# Patient Record
Sex: FEMALE | NOT HISPANIC OR LATINO | Employment: FULL TIME | URBAN - METROPOLITAN AREA
[De-identification: names, ages, dates, MRNs, and addresses within clinical notes are randomized per-mention and may not be internally consistent; named-entity substitution may affect disease eponyms.]

---

## 2024-11-06 ENCOUNTER — OFFICE VISIT (OUTPATIENT)
Dept: ORTHOPEDIC SURGERY | Facility: HOSPITAL | Age: 49
End: 2024-11-06
Payer: COMMERCIAL

## 2024-11-06 ENCOUNTER — HOSPITAL ENCOUNTER (OUTPATIENT)
Dept: RADIOLOGY | Facility: HOSPITAL | Age: 49
Discharge: HOME | End: 2024-11-06
Payer: COMMERCIAL

## 2024-11-06 VITALS — BODY MASS INDEX: 41.65 KG/M2 | HEIGHT: 65 IN | WEIGHT: 250 LBS

## 2024-11-06 DIAGNOSIS — M25.561 RIGHT KNEE PAIN, UNSPECIFIED CHRONICITY: ICD-10-CM

## 2024-11-06 DIAGNOSIS — M17.11 OSTEOARTHRITIS OF RIGHT KNEE, UNSPECIFIED OSTEOARTHRITIS TYPE: ICD-10-CM

## 2024-11-06 PROCEDURE — 99213 OFFICE O/P EST LOW 20 MIN: CPT | Performed by: ORTHOPAEDIC SURGERY

## 2024-11-06 PROCEDURE — 73564 X-RAY EXAM KNEE 4 OR MORE: CPT | Mod: RIGHT SIDE | Performed by: RADIOLOGY

## 2024-11-06 PROCEDURE — 99203 OFFICE O/P NEW LOW 30 MIN: CPT | Performed by: ORTHOPAEDIC SURGERY

## 2024-11-06 PROCEDURE — 73564 X-RAY EXAM KNEE 4 OR MORE: CPT | Mod: RT

## 2024-11-06 PROCEDURE — 3008F BODY MASS INDEX DOCD: CPT | Performed by: ORTHOPAEDIC SURGERY

## 2024-11-06 RX ORDER — LEVOTHYROXINE SODIUM 125 UG/1
1 TABLET ORAL
COMMUNITY
Start: 2024-09-21

## 2024-11-06 RX ORDER — MELOXICAM 7.5 MG/1
TABLET ORAL
COMMUNITY
Start: 2024-10-13

## 2024-11-06 RX ORDER — DEXTROAMPHETAMINE SACCHARATE, AMPHETAMINE ASPARTATE, DEXTROAMPHETAMINE SULFATE AND AMPHETAMINE SULFATE 3.75; 3.75; 3.75; 3.75 MG/1; MG/1; MG/1; MG/1
1 TABLET ORAL
COMMUNITY
Start: 2024-10-29

## 2024-11-06 NOTE — PROGRESS NOTES
This is a 48-year-old female that comes to see me today for chief complaint of right knee pain.  Pain is globally located and diffuse.  It is achy in nature.  She also has some pain in the back of the knee.  She has a Baker's cyst as well as osteoarthritis.    This is a pleasant patient in no acute distress.  They are alert and oriented x3.  They are of normal mood and affect.  They are in no acute distress.  The patient's limb is warm and well-perfused.  They have intact sensation to light touch in all lower extremity dermatomes.  The patient's quadriceps and hamstring strength is 5 of 5.  The patient can do a straight leg raise    ROM is from 0-125 deg    The patient has a weak core.  The patient has tight hamstrings.  The patient has one out of three patellofemoral crepitus.  They have some mild increased patellar tilt    They have  a stable Lachman, negative posterior drawer.  No posterior sag.  The patient is stable to varus and valgus stress at both 0 and 30°    There is mild medial joint line tenderness.  No lateral jointline tenderness    The patient has no effusion    X-rays reveal no fractures or dislocations well-preserved joint space but some early arthritis noted in all 3 compartments    Patient has osteoarthritis we recommended physical therapy for this as well as consideration of an aspiration and injection with one of our partners via ultrasound of both the Baker's cyst and the joint plan to see her back as needed

## 2024-11-22 ENCOUNTER — OFFICE VISIT (OUTPATIENT)
Dept: ORTHOPEDIC SURGERY | Facility: HOSPITAL | Age: 49
End: 2024-11-22
Payer: COMMERCIAL

## 2024-11-22 ENCOUNTER — HOSPITAL ENCOUNTER (OUTPATIENT)
Dept: RADIOLOGY | Facility: EXTERNAL LOCATION | Age: 49
Discharge: HOME | End: 2024-11-22

## 2024-11-22 DIAGNOSIS — M17.11 OSTEOARTHRITIS OF RIGHT KNEE, UNSPECIFIED OSTEOARTHRITIS TYPE: ICD-10-CM

## 2024-11-22 DIAGNOSIS — M22.2X1 PATELLOFEMORAL PAIN SYNDROME OF RIGHT KNEE: ICD-10-CM

## 2024-11-22 PROCEDURE — 99214 OFFICE O/P EST MOD 30 MIN: CPT | Performed by: STUDENT IN AN ORGANIZED HEALTH CARE EDUCATION/TRAINING PROGRAM

## 2024-11-22 PROCEDURE — 99214 OFFICE O/P EST MOD 30 MIN: CPT | Mod: 25 | Performed by: STUDENT IN AN ORGANIZED HEALTH CARE EDUCATION/TRAINING PROGRAM

## 2024-11-22 PROCEDURE — 20611 DRAIN/INJ JOINT/BURSA W/US: CPT | Mod: RT | Performed by: STUDENT IN AN ORGANIZED HEALTH CARE EDUCATION/TRAINING PROGRAM

## 2024-11-22 PROCEDURE — 2500000004 HC RX 250 GENERAL PHARMACY W/ HCPCS (ALT 636 FOR OP/ED): Performed by: STUDENT IN AN ORGANIZED HEALTH CARE EDUCATION/TRAINING PROGRAM

## 2024-11-22 PROCEDURE — L1812 KO ELASTIC W/JOINTS PRE OTS: HCPCS | Performed by: STUDENT IN AN ORGANIZED HEALTH CARE EDUCATION/TRAINING PROGRAM

## 2024-11-22 RX ORDER — MELOXICAM 15 MG/1
15 TABLET ORAL DAILY
Qty: 30 TABLET | Refills: 0 | Status: SHIPPED | OUTPATIENT
Start: 2024-11-22 | End: 2024-12-22

## 2024-11-22 NOTE — PATIENT INSTRUCTIONS
You received an injection in the doctor's office today.    This procedure used a very sharp metal object that was inserted into your body to dispense medicine in a specific area to help with your orthopedic/musculoskeletal pain.    Although we try our best to make it as painless as possible, sometimes you can have an acute episode/flare of pain shortly after the injection.  This should not last a very long time.  Please continue to use any analgesics, anti-inflammatories or pain medicines (Tylenol, Advil, ibuprofen, or any prescription medicines) that you have been using for your pain control, until the injection medicine kicks in:   Cortisone/steroid--average 2 to 4 days  Hyaluronic acid/gel injections--average 4 to 6 weeks  If this post-procedure pain lasts for several hours and you cannot obtain control with the current medication you have, you may need to proceed to the emergency room to help with your pain control.  You can apply ice over the bandage for 15-20 minutes at a time, every 1-2 hours to help with bruising or any local pain control from the actual injection.    It is recommended that you keep your Band-Aid on for at least 4-6 hours after leaving the office.    No soaking in water today: No bathtubs, hot tubs or swimming pools.  You can shower later tonight.  You can do all the water you desire tomorrow or later.  Please limit your activity to normal walking and driving for the rest of today and tomorrow.  Anything you consider exercise of that extremity or cardio exercise of the lower extremity, please wait a minimum 48 hours.

## 2024-11-25 NOTE — PROGRESS NOTES
Sports Medicine Office Note    Today's Date:  11/22/2024     HPI: Jamilah Rodriguez is a 49 y.o. female with history of right knee DJD who presents today for evaluation of right knee pain.  She states she recently developed pain in her right knee without any associated injury/trauma with progressive worsening of pain.  States pain is throughout the entire right knee with mild associated swelling.  Denies any redness, warmth, bruising, radiation of pain, numbness, tingling, weakness.  She states pain is worse with standing/walking for longer durations and with squatting/lunging or kneeling.  She has tried OTC anti-inflammatory/analgesics with minimal to no improvement.  She was seen by Dr. Betancourt on 11/6/2024 and was referred to physical therapy for right knee OA and referred to me for right knee joint ultrasound-guided cortisone injection and possible right knee Baker's cyst aspiration.  She would like to proceed with injection and possible aspiration today if appropriate.    She has no other complaints.    Physical Examination:     The RIGHT knee is without obvious signs of acute bony deformity, erythema, ecchymosis.  There is trace to grade 1 joint effusion.  The patella is without tenderness. Apprehension is positive with medial and lateral glide. Patella crepitus is positive. Patella grind is positive. The medial joint line is slightly tender and without bony crepitus or step-off. The lateral joint line is slightly tender and without bony crepitus or step-off. Flexion & extension are full and symmetrical. Varus & valgus stress test at 0° and 30° of flexion elicited mild medial and lateral joint line laxity with associated pain that improved with return to neutral position. Lachman's, Panchito's, and posterior drawer are all negative. The opposite knee is nontender and stable. Gait is slightly painful, antalgic, and tandem.     Imaging:  Radiographs of the right knee obtained 11/6/2024 were reviewed and revealed  tricompartmental DJD, moderate in patellofemoral compartment and mild in medial/lateral compartment, otherwise no acute osseous abnormalities.  The studies were reviewed by me personally in the office today.    === 11/06/24 ===    XR KNEE RIGHT 4+ VIEWS    - Impression -  1. Moderate patellofemoral and mild medial/lateral compartment  osteoarthrosis of the right knee.    MACRO:  None    Signed by: Taylor Zhu 11/9/2024 6:15 AM  Dictation workstation:   [id number]    Problem List Items Addressed This Visit    None  Visit Diagnoses         Codes    Osteoarthritis of right knee, unspecified osteoarthritis type     M17.11    Relevant Medications    meloxicam (Mobic) 15 mg tablet    Other Relevant Orders    Point of Care Ultrasound (Completed)    Referral to Physical Therapy    Knee Brace, Hinged    L Inj/Asp: R knee    Patellofemoral pain syndrome of right knee     M22.2X1    Relevant Orders    Referral to Physical Therapy    Knee Brace, Hinged    L Inj/Asp: R knee          Procedure Note:  After consent was obtained, the right knee was prepped in a sterile fashion. Ultrasound guidance was used to help insure proper needle placement into the  joint , decrease patient discomfort, and decrease collateral damage. The joint was visualized and Depo-Medrol 80 mg with lidocaine 1% 2 mL & ropivacaine 0.5% 2 mL were injected without any complications. Ultrasound images were saved on an internal file for later reference. The patient tolerated the procedure well and the area was cleaned and bandaged.  Patient ID: Jamilah oRdriguez is a 49 y.o. female.    L Inj/Asp: R knee on 11/22/2024 10:53 AM  Indications: pain  Details: 22 G needle, ultrasound-guided superolateral approach  Medications: 2 mL lidocaine 10 mg/mL (1 %); 80 mg methylPREDNISolone acetate 40 mg/mL; 2 mL ropivacaine 5 mg/mL (0.5 %)  Outcome: tolerated well, no immediate complications  Procedure, treatment alternatives, risks and benefits explained, specific risks  discussed. Consent was given by the patient. Immediately prior to procedure a time out was called to verify the correct patient, procedure, equipment, support staff and site/side marked as required. Patient was prepped and draped in the usual sterile fashion.         Assessment and Plan:    We reviewed the exam and x-ray findings and discussed the conservative and surgical treatment options. We agreed to a diagnostic and hopefully therapeutic cortisone injection into the right knee joint.  Minimal Baker's cyst identified on point-of-care ultrasound, therefore deferred aspiration.  She tolerated this procedure well. Activity modifications were reviewed. Physical therapy referral provided and discussed the importance of regular home exercises.  Recommended prescription doses of Tylenol, Voltaren gel, and encouraged use of ice or heat as needed for acute flares of pain.  Prescribed short course of meloxicam 15 mg to be taken once daily with food as needed for acute flares of pain.  Provided reaction OA knee brace to be worn with daily activity and exercise.  Discussed with patient that we may consider repeating cortisone injection in 3 months or more if providing adequate relief.  Plan for follow-up in 3 months for re-evaluation, otherwise may follow-up sooner if any new concerns arise.  Discussed this plan with the patient who is understanding and agreeable.    Patient was prescribed a reaction OA knee brace for right knee DJD with patellofemoral and medial/lateral joint line instability with associated pain. The patient is ambulatory with or without aid; but, has weakness, instability and/or deformity of their right knee which requires stabilization from this orthosis to improve their function.      Verbal and written instructions for the use, wear schedule, cleaning and application of this item were given.  Patient was instructed that should the brace result in increased pain, decreased sensation, increased  swelling, or an overall worsening of their medical condition, to please contact our office immediately.     Orthotic management and training was provided for skin care, modifications due to healing tissues, edema changes, interruption in skin integrity, and safety precautions with the orthosis.     **This note was dictated using Dragon speech recognition software and was not corrected for spelling or grammatical errors**.    Nguyễn Sue,   Primary Care Sports Medicine  [facility] [place]

## 2024-12-16 RX ORDER — LIDOCAINE HYDROCHLORIDE 10 MG/ML
2 INJECTION, SOLUTION INFILTRATION; PERINEURAL
Status: COMPLETED | OUTPATIENT
Start: 2024-11-22 | End: 2024-11-22

## 2024-12-16 RX ORDER — ROPIVACAINE HYDROCHLORIDE 5 MG/ML
2 INJECTION, SOLUTION EPIDURAL; INFILTRATION; PERINEURAL
Status: COMPLETED | OUTPATIENT
Start: 2024-11-22 | End: 2024-11-22

## 2024-12-16 RX ORDER — METHYLPREDNISOLONE ACETATE 40 MG/ML
80 INJECTION, SUSPENSION INTRA-ARTICULAR; INTRALESIONAL; INTRAMUSCULAR; SOFT TISSUE
Status: COMPLETED | OUTPATIENT
Start: 2024-11-22 | End: 2024-11-22

## 2024-12-21 DIAGNOSIS — M17.11 OSTEOARTHRITIS OF RIGHT KNEE, UNSPECIFIED OSTEOARTHRITIS TYPE: ICD-10-CM

## 2024-12-21 RX ORDER — MELOXICAM 15 MG/1
15 TABLET ORAL DAILY
Qty: 30 TABLET | Refills: 0 | Status: SHIPPED | OUTPATIENT
Start: 2024-12-21

## 2025-01-15 NOTE — PROGRESS NOTES
Physical Therapy  Physical Therapy Orthopedic Evaluation    Patient Name: Jamilah Rodriguez  MRN: [id number]  Today's Date: 1/16/2025  Time Calculation  Start Time: 1240  Stop Time: 1320  Time Calculation (min): 40 min  Reason for visit: right knee OA/PFS   Referring MD: Nguyễn Sue DO  Insurance: 2025 BENEFIT / NO AUTH / 20% COINS / 60 VISITS PT,OT,SP /  $750 DED / $3500 OOP /  [employer]   DX: right knee pain, PFS, knee OA right       Current Problem  1. Right knee pain, unspecified chronicity  Follow Up In Physical Therapy      2. Osteoarthritis of right knee, unspecified osteoarthritis type  Referral to Physical Therapy    Follow Up In Physical Therapy      3. Patellofemoral pain syndrome of right knee  Referral to Physical Therapy    Follow Up In Physical Therapy          General:  General  Reason for Referral: Right knee OA/PFS  Referred By: Nguyễn Sue/ Serafin  Precautions:  Precautions  STEADI Fall Risk Score (The score of 4 or more indicates an increased risk of falling): 0  Pain:       Subjective:     Subjective   Chief Complaint: 5 year history of right knee pain.  Developed a bakers cyst in 2023 that I tried to ignore /deal with but was not getting better.  Had cortisone injection that did seem to help, I also had bakers cyst drained     NATY: none     Current Condition: same/unchanged     PAIN  Intensity (0-10): 2-3/10 up to 6-7/10  Location: front mainly and inside, and back of knee   Description: achy , stiffness , tightness     Aggravating Factors:  movement, stairs, walking, getting up and down   Relieving Factors:  injection, ice, NSAID    Relevant Information (PMH & Previous Tests/Imaging):   Xray right knee   1. Moderate patellofemoral and mild medial/lateral compartment   osteoarthrosis of the right knee.     Previous Interventions/Treatments: injection / aspiration  (bakers cyst)     Prior Level of Function (PLOF)  Exercise/Physical Activity: yes, mainly walking   Work/School:Full Time   HR ,  progressive   Living situation/Environment: live alone , multistory/split level     Treatment Goals: get stronger, get back to be able to walk mile w/o pain , be able play pickle ball     Red Flags: Do you have any of the following? No   Fever/chills, unexplained weight changes, dizziness/fainting, unexplained change in bowel or bladder functions, unexplained malaise or muscle weakness, night pain/sweats, numbness or tingling  Medical history reviewed:  yes (scanned in chart)    Objective:  Objective       Observation/Posture: mild valgus R>L   Gait: non antalgic       Balance  SL Balance: good left , inc wobble right   Tandem: good   DL Squat:  partial range,  on toes,  favors left,  unable due to pain,   SL Squat: partial range,  valgus collapse,     Knee ROM        Left knee: Ext= 0 Flex= 135  Right knee: Ext= 0 Flex= 132    Knee Strength        Extension: L= 5/5 [] R= 5/5  Flexion: L= 5/5 [] R= 5/5       Hip Strength MMT  Flex: L= 4/5 [] R= 4-/5   Abd L= 4/5 [] R= 4-/5  Add L= 5/5 [] R= 5/5  Ext L= 4+/5 [] R= 4+/5      Flexibility:  Hamstrings: MIN  Quads:MIN  It Band:MOD          Outcome Measures:  Other Measures  Lower Extremity Funtional Score (LEFS): 40/80     EDUCATION: home exercise program, plan of care, activity modifications, pain management, and injury pathology       Goals:  Active       PT Problem       PT Goal 1       Start:  01/16/25    Expected End:  02/13/25       Patient will demonstrate good understanding and compliance with HEP   Right knee AROM 0-135          PT Goal 2       Start:  01/16/25    Expected End:  03/13/25       Lat dip w/o valgus collapse  Squat full range with even weight distribution  Hip strength 4+/5 or better to improve stability and function   Will ascend/descend stairs with normalized pattern   Increase LEFS score by 9 points              Treatments:   Access Code: [email or web address]  URL: [email or web address]  Date: 01/16/2025  Prepared by: Ken  Jackie    Exercises  - Sidelying Quadriceps Stretch  - 2-3 x daily - 1 sets - 3-4 reps - 30 hold  - Supine ITB Stretch with Strap  - 2-3 x daily - 3-4 reps - 30 hold  - Seated Table Hamstring Stretch  - 2-3 x daily - 1 sets - 3-4 reps - 30 hold  - Small Range Straight Leg Raise  - 1-2 x daily - 2-3 sets - 10 reps  - Sidelying Hip Abduction  - 1-2 x daily - 2-3 sets - 10 reps  - Prone Hip Extension  - 1-2 x daily - 2-3 sets - 10 reps    Assessment: Patient presents with signs and symptoms consistent with right knee OA/PFS , resulting in limited participation in pain-free ADLs and inability to perform at their prior level of function. Pt would benefit from physical therapy to address the impairments found & listed previously in the objective section in order to return to safe and pain-free ADLs and prior level of function.     Pain, Impaired balance, Impaired gait, Impaired stair negotiation , Impaired transfers, Decreased flexibility, Decreased strength, Limitations to normal ADL's, and Decreased knowledge of HEP     Clinical presentation:  Stable   Level of Complexity low     Plan:      Planned Interventions include: therapeutic exercise, self-care home management, manual therapy, therapeutic activities, gait training, neuromuscular coordination, aquatic therapy, modalities PRN  Frequency: 1 /week   Duration: 4-6 weeks    Ken Mejia, PT

## 2025-01-16 ENCOUNTER — EVALUATION (OUTPATIENT)
Dept: PHYSICAL THERAPY | Facility: CLINIC | Age: 50
End: 2025-01-16
Payer: COMMERCIAL

## 2025-01-16 DIAGNOSIS — M17.11 OSTEOARTHRITIS OF RIGHT KNEE, UNSPECIFIED OSTEOARTHRITIS TYPE: ICD-10-CM

## 2025-01-16 DIAGNOSIS — M25.561 RIGHT KNEE PAIN, UNSPECIFIED CHRONICITY: Primary | ICD-10-CM

## 2025-01-16 DIAGNOSIS — M22.2X1 PATELLOFEMORAL PAIN SYNDROME OF RIGHT KNEE: ICD-10-CM

## 2025-01-16 PROCEDURE — 97161 PT EVAL LOW COMPLEX 20 MIN: CPT | Mod: GP | Performed by: PHYSICAL THERAPIST

## 2025-01-16 PROCEDURE — 97110 THERAPEUTIC EXERCISES: CPT | Mod: GP | Performed by: PHYSICAL THERAPIST

## 2025-01-21 ENCOUNTER — TREATMENT (OUTPATIENT)
Dept: PHYSICAL THERAPY | Facility: CLINIC | Age: 50
End: 2025-01-21
Payer: COMMERCIAL

## 2025-01-21 DIAGNOSIS — M22.2X1 PATELLOFEMORAL PAIN SYNDROME OF RIGHT KNEE: ICD-10-CM

## 2025-01-21 DIAGNOSIS — M25.561 RIGHT KNEE PAIN, UNSPECIFIED CHRONICITY: ICD-10-CM

## 2025-01-21 DIAGNOSIS — M17.11 OSTEOARTHRITIS OF RIGHT KNEE, UNSPECIFIED OSTEOARTHRITIS TYPE: ICD-10-CM

## 2025-01-21 PROCEDURE — 97110 THERAPEUTIC EXERCISES: CPT | Mod: GP | Performed by: PHYSICAL THERAPIST

## 2025-01-21 NOTE — PROGRESS NOTES
Physical Therapy  Physical Therapy Treatment    Patient Name: Jamilah Rodriguez  MRN: [id number]  Today's Date: 1/21/2025  Time Calculation  Start Time: 0915  Stop Time: 0955  Time Calculation (min): 40 min    Visit # 2   Reason for visit: right knee OA/PFS   Referring MD: Nguyễn Sue DO  Insurance: 2025 BENEFIT / NO AUTH / 20% COINS / 60 VISITS PT,OT,SP /  $750 DED / $3500 OOP /  [employer]   DX: right knee pain, PFS, knee OA right     Current Problem  1. Osteoarthritis of right knee, unspecified osteoarthritis type  Follow Up In Physical Therapy      2. Patellofemoral pain syndrome of right knee  Follow Up In Physical Therapy      3. Right knee pain, unspecified chronicity  Follow Up In Physical Therapy          General  Reason for Referral: Right knee OA/PFS  Referred By: Nguyễn Sue/ Serafin Villalobos  Precautions  STEADI Fall Risk Score (The score of 4 or more indicates an increased risk of falling): 0.  Pain       Subjective:   Subjective   Patient reports 2-3/10, bakers cyst more than the knee pain.  Reports compliance with HEP and feels good to do them.      Objective:   Objective   Hip Strength MMT  Flex:L= 4/5 [] R= 4-/5   AbdL= 4/5 [] R= 4-/5  AddL= 5/5 [] R= 5/5  ExtL= 4+/5 [] R= 4+/5    Flexibility:  Hamstrings: MIN  Quads:MIN  It Band:MOD       Treatments:    Stepper L3 x5'   Stand quad stretch 1' chelsy   IT band wall 1'   SLR 2x10 chelsy   Side hip abd/add chelsy  2x10   Prone hip ext 2x10   Squat GTB abd 2x10 (Added to HEP)    Inch worm GTB 20' x2 laps (Added to HEP)       Access Code: [email or web address]     Charges 3 TE     - PT Therapeutic Procedures Time Entry  Therapeutic Exercise Time Entry: 40 -       Assessment:    Instructed on standing versions of stretches to add to HEP.  Tolerated session and additions w/o incident.      Plan:    Continue per POC to increase ROM, flexibility and strength to decrease pain and improve function in normal ADL's.        Ken Mejia, PT

## 2025-01-23 ENCOUNTER — APPOINTMENT (OUTPATIENT)
Dept: PHYSICAL THERAPY | Facility: CLINIC | Age: 50
End: 2025-01-23
Payer: COMMERCIAL

## 2025-01-29 ENCOUNTER — TREATMENT (OUTPATIENT)
Dept: PHYSICAL THERAPY | Facility: CLINIC | Age: 50
End: 2025-01-29
Payer: COMMERCIAL

## 2025-01-29 DIAGNOSIS — M22.2X1 PATELLOFEMORAL PAIN SYNDROME OF RIGHT KNEE: ICD-10-CM

## 2025-01-29 DIAGNOSIS — M25.561 RIGHT KNEE PAIN, UNSPECIFIED CHRONICITY: ICD-10-CM

## 2025-01-29 DIAGNOSIS — M17.11 OSTEOARTHRITIS OF RIGHT KNEE, UNSPECIFIED OSTEOARTHRITIS TYPE: ICD-10-CM

## 2025-01-29 PROCEDURE — 97110 THERAPEUTIC EXERCISES: CPT | Mod: GP | Performed by: PHYSICAL THERAPIST

## 2025-01-29 NOTE — PROGRESS NOTES
"Physical Therapy  Physical Therapy Treatment    Patient Name: Jamilah Rodriguez  MRN: [id number]  Today's Date: 1/29/2025  Time Calculation  Start Time: 0915  Stop Time: 1000  Time Calculation (min): 45 min    Visit # 3   Reason for visit: right knee OA/PFS   Referring MD: Nguyễn Sue DO  Insurance: 2025 BENEFIT / NO AUTH / 20% COINS / 60 VISITS PT,OT,SP /  $750 DED / $3500 OOP /  [employer]   DX: right knee pain, PFS, knee OA right     Current Problem  1. Osteoarthritis of right knee, unspecified osteoarthritis type  Follow Up In Physical Therapy      2. Patellofemoral pain syndrome of right knee  Follow Up In Physical Therapy      3. Right knee pain, unspecified chronicity  Follow Up In Physical Therapy          General  Reason for Referral: Right knee OA/PFS  Referred By: Nguyễn Sue/ Serafin Villalobos  Precautions  STEADI Fall Risk Score (The score of 4 or more indicates an increased risk of falling): 0  Pain       Subjective:   Subjective   Patient reports 1-2/10, bakers cyst more than the knee pain.  I played [employer] ball on Saturday for a couple of hours and it was good.  I did come home and ice when I got home.    HEP compliance - yes     Objective:   Objective   Hip Strength MMT  Flex:L= 5-/5 [] R= 5-/5   AbdL= 4/5 [] R= 4/5  AddL= 5/5 [] R= 5/5  ExtL= 5-/5 [] R= 5-/5    Flexibility:  Hamstrings: MIN  Quads:MIN  It Band:MOD       Treatments:    Stepper L3 x5'   Stand quad stretch 1' jessica   IT band wall 1'   Cybex hip abd/add  40# 2x10   Cybex hip ext 55# 2x10   SLR 2x10 jessica - in HEP    Side hip abd/add jessica  2x10 - in HEP   Prone hip ext 2x10 - in HEP    Squat GTB abd 2x10   Inch worm GTB 20' x2 laps   Lat dip 4\" 2x10   DBE 2' x2 way      Access Code: [email or web address]     Charges 3 TE     - PT Therapeutic Procedures Time Entry  Therapeutic Exercise Time Entry: 45 -       Assessment:    Improved strength noted in JESSICA hips and per patient subjective reports.        Plan:    Continue per POC to increase ROM, flexibility and " strength to decrease pain and improve function in normal ADL's.        Ken Mejia, PT

## 2025-01-30 ENCOUNTER — APPOINTMENT (OUTPATIENT)
Dept: PHYSICAL THERAPY | Facility: CLINIC | Age: 50
End: 2025-01-30
Payer: COMMERCIAL

## 2025-02-04 ENCOUNTER — TREATMENT (OUTPATIENT)
Dept: PHYSICAL THERAPY | Facility: CLINIC | Age: 50
End: 2025-02-04
Payer: COMMERCIAL

## 2025-02-04 DIAGNOSIS — M17.11 OSTEOARTHRITIS OF RIGHT KNEE, UNSPECIFIED OSTEOARTHRITIS TYPE: Primary | ICD-10-CM

## 2025-02-04 DIAGNOSIS — M25.561 RIGHT KNEE PAIN, UNSPECIFIED CHRONICITY: ICD-10-CM

## 2025-02-04 DIAGNOSIS — M22.2X1 PATELLOFEMORAL PAIN SYNDROME OF RIGHT KNEE: ICD-10-CM

## 2025-02-04 PROCEDURE — 97110 THERAPEUTIC EXERCISES: CPT | Mod: GP,CQ

## 2025-02-04 ASSESSMENT — PAIN - FUNCTIONAL ASSESSMENT: PAIN_FUNCTIONAL_ASSESSMENT: 0-10

## 2025-02-04 ASSESSMENT — PAIN SCALES - GENERAL: PAINLEVEL_OUTOF10: 4

## 2025-02-04 NOTE — PROGRESS NOTES
Physical Therapy  Physical Therapy Treatment    Patient Name: Jamilah Rodriguez  MRN: [id number]  Today's Date: 2/4/2025  Time Calculation  Start Time: 1515  Stop Time: 1555  Time Calculation (min): 40 min    Visit # 4   Reason for visit: right knee OA/PFS   Referring MD: Nguyễn Sue DO  Insurance: 2025 BENEFIT / NO AUTH / 20% COINS / 60 VISITS PT,OT,SP /  $750 DED / $3500 OOP /  [employer]   DX: right knee pain, PFS, knee OA right     Current Problem  1. Osteoarthritis of right knee, unspecified osteoarthritis type  Follow Up In Physical Therapy      2. Patellofemoral pain syndrome of right knee  Follow Up In Physical Therapy      3. Right knee pain, unspecified chronicity  Follow Up In Physical Therapy        General  Reason for Referral: Right knee OA/PFS  Referred By: Nguyễn Sue/ Serafin Villalobos  Precautions  STEADI Fall Risk Score (The score of 4 or more indicates an increased risk of falling): 0  Pain  Pain Assessment: 0-10  0-10 (Numeric) Pain Score: 4    Subjective:   Subjective   Patient reports having more anterior pain than pain in posterior portion.   HEP compliance - yes     Objective:   Objective   Hip Strength MMT  Flex:L= 5-/5 [] R= 5-/5   AbdL= 4/5 [] R= 4/5  AddL= 5/5 [] R= 5/5  ExtL= 5-/5 [] R= 5-/5    Flexibility:  Hamstrings: MIN  Quads:MIN  It Band:MOD     Pt unable to stabilize pelvis during marching bridge    Treatments:  Stepper L3 x5'   Supine quad stretch with stretch strap 1' x2   Incline stretch 1' gastroc   Incline stretch 1' soleous   HS stretch 1' each steps   Standing hip abduction 3x10 44#   Step ups x20   Step downs x20  Marching bridge 3x10     Access Code: [email or web address]     Charges 3 TE     -   -       Assessment:    Post supine hip flexor stretch patient reported feeling much last pain in anterior pain. Pt reported less pain during step ups with cueing for increased heel drive.    Plan:    Continue per POC to increase ROM, flexibility and strength to decrease pain and improve function  in normal ADL's.  Quad flexibility      Bam Calvert, CEDRIC

## 2025-02-11 ENCOUNTER — TREATMENT (OUTPATIENT)
Dept: PHYSICAL THERAPY | Facility: CLINIC | Age: 50
End: 2025-02-11
Payer: COMMERCIAL

## 2025-02-11 DIAGNOSIS — M17.11 OSTEOARTHRITIS OF RIGHT KNEE, UNSPECIFIED OSTEOARTHRITIS TYPE: ICD-10-CM

## 2025-02-11 DIAGNOSIS — M25.561 RIGHT KNEE PAIN, UNSPECIFIED CHRONICITY: ICD-10-CM

## 2025-02-11 DIAGNOSIS — M22.2X1 PATELLOFEMORAL PAIN SYNDROME OF RIGHT KNEE: ICD-10-CM

## 2025-02-11 PROCEDURE — 97110 THERAPEUTIC EXERCISES: CPT | Mod: GP,CQ

## 2025-02-11 ASSESSMENT — PAIN SCALES - GENERAL: PAINLEVEL_OUTOF10: 2

## 2025-02-11 ASSESSMENT — PAIN - FUNCTIONAL ASSESSMENT: PAIN_FUNCTIONAL_ASSESSMENT: 0-10

## 2025-02-11 NOTE — PROGRESS NOTES
"Physical Therapy  Physical Therapy Treatment    Patient Name: Jamilah Rodriguez  MRN: [id number]  Today's Date: 2/11/2025  Time Calculation  Start Time: 1645  Stop Time: 1725  Time Calculation (min): 40 min    Visit # 5   Reason for visit: right knee OA/PFS   Referring MD: Nguyễn Sue DO  Insurance: 2025 BENEFIT / NO AUTH / 20% COINS / 60 VISITS PT,OT,SP /  $750 DED / $3500 OOP /  [employer]   DX: right knee pain, PFS, knee OA right     Current Problem  1. Osteoarthritis of right knee, unspecified osteoarthritis type  Follow Up In Physical Therapy      2. Patellofemoral pain syndrome of right knee  Follow Up In Physical Therapy      3. Right knee pain, unspecified chronicity  Follow Up In Physical Therapy          General  Reason for Referral: Right knee OA/PFS  Referred By: Nguyễn Sue/ Serafin Villalobos  Precautions  STEADI Fall Risk Score (The score of 4 or more indicates an increased risk of falling): 0  Pain  Pain Assessment: 0-10  0-10 (Numeric) Pain Score: 2    Subjective:   Subjective   Patient reports having a better day than last week's pain level. Pt reports playing [employer] ball for 1.5 hours at home.   HEP compliance - yes     Objective:   Objective   Hip Strength MMT  Flex:L= 5-/5 [] R= 5-/5   AbdL= 4/5 [] R= 4/5  AddL= 5/5 [] R= 5/5  ExtL= 5-/5 [] R= 5-/5    Flexibility:  Hamstrings: MIN  Quads:MIN  It Band:MOD     Decreased ER during STS.     Treatments:  Stepper L3 x5'   Incline stretch 1' gastroc   Incline stretch 1' soleous   Clamshell GTB 3x10   Lateral lunges 3x10   Sit to stands with GTB at knees 2x15   Side lying abduction 2x10 5\"   Side stepping 25# 30ft x2     Access Code: [email or web address]     Charges 3 TE     -   -       Assessment:    Pt reported fatigue in hip post treatment session but felt that it was a productive burn. Pt required familiar cueing to maintain hips forward during side lying exercises.     Plan:    Continue per POC to increase ROM, flexibility and strength to decrease pain and improve " function in normal ADL's.  Quad flexibility / glute med endurance      Bam Calvert, PTA

## 2025-02-19 ENCOUNTER — TREATMENT (OUTPATIENT)
Dept: PHYSICAL THERAPY | Facility: CLINIC | Age: 50
End: 2025-02-19
Payer: COMMERCIAL

## 2025-02-19 DIAGNOSIS — M25.561 RIGHT KNEE PAIN, UNSPECIFIED CHRONICITY: ICD-10-CM

## 2025-02-19 DIAGNOSIS — M17.11 OSTEOARTHRITIS OF RIGHT KNEE, UNSPECIFIED OSTEOARTHRITIS TYPE: ICD-10-CM

## 2025-02-19 DIAGNOSIS — M22.2X1 PATELLOFEMORAL PAIN SYNDROME OF RIGHT KNEE: ICD-10-CM

## 2025-02-19 PROCEDURE — 97110 THERAPEUTIC EXERCISES: CPT | Mod: GP | Performed by: PHYSICAL THERAPIST

## 2025-02-19 ASSESSMENT — PAIN - FUNCTIONAL ASSESSMENT: PAIN_FUNCTIONAL_ASSESSMENT: 0-10

## 2025-02-19 ASSESSMENT — PAIN SCALES - GENERAL: PAINLEVEL_OUTOF10: 0 - NO PAIN

## 2025-02-19 NOTE — PROGRESS NOTES
"Physical Therapy  Physical Therapy Treatment and Discharge     Patient Name: Jamilah Rodriguez  MRN: [id number]  Today's Date: 2/19/2025  Time Calculation  Start Time: 1527  Stop Time: 1605  Time Calculation (min): 38 min    Visit # 6   Reason for visit: right knee OA/PFS   Referring MD: Nguyễn Sue DO  Insurance: 2025 BENEFIT / NO AUTH / 20% COINS / 60 VISITS PT,OT,SP /  $750 DED / $3500 OOP /  [employer]   DX: right knee pain, PFS, knee OA right     Current Problem  1. Osteoarthritis of right knee, unspecified osteoarthritis type  Follow Up In Physical Therapy      2. Patellofemoral pain syndrome of right knee  Follow Up In Physical Therapy      3. Right knee pain, unspecified chronicity  Follow Up In Physical Therapy          General  Reason for Referral: Right knee OA/PFS  Referred By: Nguyễn Sue/ Serafin Villalobos  Precautions  STEADI Fall Risk Score (The score of 4 or more indicates an increased risk of falling): 0  Pain  Pain Assessment: 0-10  0-10 (Numeric) Pain Score: 0 - No pain    Subjective:   Subjective   Patient reports feeling pretty good with no current pain.  The last week has been good, I will still get some bad days that can seem to come out of nowhere.    HEP compliance - yes     Objective:   Objective   Hip Strength MMT  Flex:L= 5/5 [] R= 5/5   AbdL= 5-/5 [] R= 5-/5  AddL= 5/5 [] R= 5/5  ExtL= 5/5 [] R= 5/5    Knee ROM        Right knee:  Ext= 0, Flex= 135    Flexibility:  Hamstrings: MIN  Quads:MIN  It Band:MOD       Treatments:  Stepper L3 x5'   Lat dips \" 2x10   Step downs fwd 4\" x10 chelsy (Added to HEP)    Incline stretch 1' gastroc   Incline stretch 1' soleous   Side stepping 25# 20ft x2   Stand quad stretch 1' chelsy   Hamstring 1' chelsy     Access Code: [email or web address]     Charges 3 TE     - PT Therapeutic Procedures Time Entry  Therapeutic Exercise Time Entry: 38 -       Assessment:    Patient has met most goals set at evaluation and is comfortable continuing on her own at this time.    Resolved       PT " Problem       PT Goal 1 (Met)       Start:  01/16/25    Expected End:  02/13/25    Resolved:  02/19/25    Patient will demonstrate good understanding and compliance with HEP -goal met   Right knee AROM 0-135 -goal met          PT Goal 2 (Met)       Start:  01/16/25    Expected End:  03/13/25    Resolved:  02/19/25    Lat dip w/o valgus collapse -goal met   Squat full range with even weight distribution -goal met   Hip strength 4+/5 or better to improve stability and function -goal met   Will ascend/descend stairs with normalized pattern -goal met   Increase LEFS score by 9 points - nearly met                Plan:    Discharge to HEP and MD at this time secondary to goals mostly met.        Ken Mejia, PT

## 2025-02-21 ENCOUNTER — OFFICE VISIT (OUTPATIENT)
Dept: ORTHOPEDIC SURGERY | Facility: HOSPITAL | Age: 50
End: 2025-02-21
Payer: COMMERCIAL

## 2025-02-21 ENCOUNTER — HOSPITAL ENCOUNTER (OUTPATIENT)
Dept: RADIOLOGY | Facility: EXTERNAL LOCATION | Age: 50
Discharge: HOME | End: 2025-02-21

## 2025-02-21 DIAGNOSIS — M22.2X1 PATELLOFEMORAL PAIN SYNDROME OF RIGHT KNEE: ICD-10-CM

## 2025-02-21 DIAGNOSIS — M17.11 OSTEOARTHRITIS OF RIGHT KNEE, UNSPECIFIED OSTEOARTHRITIS TYPE: Primary | ICD-10-CM

## 2025-02-21 PROCEDURE — 2500000004 HC RX 250 GENERAL PHARMACY W/ HCPCS (ALT 636 FOR OP/ED): Performed by: STUDENT IN AN ORGANIZED HEALTH CARE EDUCATION/TRAINING PROGRAM

## 2025-02-21 PROCEDURE — 20611 DRAIN/INJ JOINT/BURSA W/US: CPT | Mod: RT | Performed by: STUDENT IN AN ORGANIZED HEALTH CARE EDUCATION/TRAINING PROGRAM

## 2025-02-21 PROCEDURE — 99214 OFFICE O/P EST MOD 30 MIN: CPT | Performed by: STUDENT IN AN ORGANIZED HEALTH CARE EDUCATION/TRAINING PROGRAM

## 2025-02-21 PROCEDURE — 99214 OFFICE O/P EST MOD 30 MIN: CPT | Mod: 25 | Performed by: STUDENT IN AN ORGANIZED HEALTH CARE EDUCATION/TRAINING PROGRAM

## 2025-02-21 RX ORDER — LIDOCAINE HYDROCHLORIDE 10 MG/ML
2 INJECTION, SOLUTION INFILTRATION; PERINEURAL
Status: COMPLETED | OUTPATIENT
Start: 2025-02-21 | End: 2025-02-21

## 2025-02-21 RX ORDER — ROPIVACAINE HYDROCHLORIDE 5 MG/ML
2 INJECTION, SOLUTION EPIDURAL; INFILTRATION; PERINEURAL
Status: COMPLETED | OUTPATIENT
Start: 2025-02-21 | End: 2025-02-21

## 2025-02-21 RX ORDER — METHYLPREDNISOLONE ACETATE 40 MG/ML
80 INJECTION, SUSPENSION INTRA-ARTICULAR; INTRALESIONAL; INTRAMUSCULAR; SOFT TISSUE
Status: COMPLETED | OUTPATIENT
Start: 2025-02-21 | End: 2025-02-21

## 2025-02-21 RX ADMIN — LIDOCAINE HYDROCHLORIDE 2 ML: 10 INJECTION, SOLUTION INFILTRATION; PERINEURAL at 09:59

## 2025-02-21 RX ADMIN — METHYLPREDNISOLONE ACETATE 80 MG: 40 INJECTION, SUSPENSION INTRA-ARTICULAR; INTRALESIONAL; INTRAMUSCULAR; INTRASYNOVIAL; SOFT TISSUE at 09:59

## 2025-02-21 RX ADMIN — ROPIVACAINE HYDROCHLORIDE 2 ML: 5 INJECTION EPIDURAL; INFILTRATION; PERINEURAL at 09:59

## 2025-02-21 NOTE — PROGRESS NOTES
Sports Medicine Office Note    Today's Date:  02/21/2025     HPI: Jamilah Rodriguez is a 49 y.o. female with history of right knee DJD who presents today for follow-up of right knee pain.      11/22/2024: She states she recently developed pain in her right knee without any associated injury/trauma with progressive worsening of pain.  States pain is throughout the entire right knee with mild associated swelling.  Denies any redness, warmth, bruising, radiation of pain, numbness, tingling, weakness.  She states pain is worse with standing/walking for longer durations and with squatting/lunging or kneeling.  She has tried OTC anti-inflammatory/analgesics with minimal to no improvement.  She was seen by Dr. Betancourt on 11/6/2024 and was referred to physical therapy for right knee OA and referred to me for right knee joint ultrasound-guided cortisone injection and possible right knee Baker's cyst aspiration.  She would like to proceed with injection and possible aspiration today if appropriate.     2/21/2025: She presents today for follow-up of chronic right knee pain s/p right knee cortisone injection and Baker's cyst aspiration on 11/22/2024.  She states she got roughly 60% relief of her pain following this injection which lasted until roughly 1 week ago when she started noticing pain returned.  Denies any interval injury/trauma or any acute worsening of swelling.  Also denies any new redness, warmth, bruising, radiation of pain, numbness, tingling, weakness.  States pain is primarily over anterior lateral aspect of the right knee and worse with going from seated to standing position, prolonged durations of walking/standing, or squatting down and going up or down stairs.  Denies any new mechanical locking/catching.  She has been using reaction OA knee brace with activity which seems to provide extra comfort and stability.  She has tried Tylenol and meloxicam for her pain recently without significant relief.  She has been  following with physical therapy regularly and doing her home exercises regularly which seems to be helping with overall stability of the knee.  She is interested in repeating cortisone injection of the right knee today if appropriate.    She has no other complaints.    Physical Examination:     The RIGHT knee is without obvious signs of acute bony deformity, erythema, ecchymosis.  There is trace to grade 1 joint effusion.  The patella is without tenderness. Apprehension is positive with medial and lateral glide. Patella crepitus is positive. Patella grind is positive. The medial joint line is slightly tender and without bony crepitus or step-off. The lateral joint line is slightly tender and without bony crepitus or step-off. Flexion & extension are full and symmetrical. Varus & valgus stress test at 0° and 30° of flexion elicited mild medial and lateral joint line laxity with associated pain that improved with return to neutral position. Lachman's, Panchito's, and posterior drawer are all negative. The opposite knee is nontender and stable. Gait is slightly painful, antalgic, and tandem.     Imaging:  Radiographs of the right knee obtained 11/6/2024 were reviewed and revealed tricompartmental DJD, moderate in patellofemoral compartment and mild in medial/lateral compartment, otherwise no acute osseous abnormalities.     The studies were reviewed by me personally in the office today.    Problem List Items Addressed This Visit             ICD-10-CM    Patellofemoral pain syndrome of right knee M22.2X1    Osteoarthritis of right knee - Primary M17.11    Relevant Orders    Point of Care Ultrasound (Completed)     Procedure Note:  After consent was obtained, the right knee was prepped in a sterile fashion. Ultrasound guidance was used to help insure proper needle placement into the  joint , decrease patient discomfort, and decrease collateral damage. The joint was visualized and Depo-Medrol 80 mg with lidocaine 1% 2 mL  & ropivacaine 0.5% 2 mL were injected without any complications. Ultrasound images were saved on an internal file for later reference. The patient tolerated the procedure well and the area was cleaned and bandaged.  Patient ID: Jamilah Rodriguez is a 49 y.o. female.    L Inj/Asp: R knee on 2/21/2025 9:59 AM  Indications: pain  Details: 22 G needle, ultrasound-guided superolateral approach  Medications: 2 mL lidocaine 10 mg/mL (1 %); 80 mg methylPREDNISolone acetate 40 mg/mL; 2 mL ropivacaine 5 mg/mL (0.5 %)  Outcome: tolerated well, no immediate complications  Procedure, treatment alternatives, risks and benefits explained, specific risks discussed. Consent was given by the patient. Immediately prior to procedure a time out was called to verify the correct patient, procedure, equipment, support staff and site/side marked as required. Patient was prepped and draped in the usual sterile fashion.         Assessment and Plan:    We reviewed the exam and imaging findings and discussed the conservative and surgical treatment options. We agreed to a repeat hopefully therapeutic cortisone injection into the right knee joint.   She tolerated this well. Activity modifications were reviewed.  She will keep any scheduled follow-up with physical therapy, discussed the importance of weight loss in regards to pain associated with knee DJD, and encouraged her to continue with regular home exercises.  Recommended prescription doses of Tylenol, Voltaren gel, and encouraged use of ice or heat as needed for acute flares of pain.  She may otherwise continue meloxicam once daily with food as needed for acute flares of pain.  She should continue with reaction OA knee brace during activity and exercise for added comfort and stability.  Discussed with patient that we may consider repeating cortisone injection in 3 months or more if she gets adequate pain relief and pain returns.  Alternatively, may consider viscosupplementation injections if  she does not get adequate relief following today's injections.  Plan for follow-up in 3 months for reevaluation, otherwise may follow-up sooner if any new concerns arise.  Discussed this plan with the patient who is understanding and agreeable.    **This note was dictated using Dragon speech recognition software and was not corrected for spelling or grammatical errors**.    Nguyễn Sue,   Primary Care Sports Medicine  [facility]

## 2025-05-16 ENCOUNTER — APPOINTMENT (OUTPATIENT)
Dept: ORTHOPEDIC SURGERY | Facility: HOSPITAL | Age: 50
End: 2025-05-16
Payer: COMMERCIAL

## 2025-05-20 ENCOUNTER — APPOINTMENT (OUTPATIENT)
Dept: ORTHOPEDIC SURGERY | Facility: HOSPITAL | Age: 50
End: 2025-05-20
Payer: COMMERCIAL

## 2025-05-27 ENCOUNTER — HOSPITAL ENCOUNTER (OUTPATIENT)
Dept: RADIOLOGY | Facility: EXTERNAL LOCATION | Age: 50
Discharge: HOME | End: 2025-05-27

## 2025-05-27 ENCOUNTER — OFFICE VISIT (OUTPATIENT)
Dept: ORTHOPEDIC SURGERY | Facility: HOSPITAL | Age: 50
End: 2025-05-27
Payer: COMMERCIAL

## 2025-05-27 DIAGNOSIS — M17.11 OSTEOARTHRITIS OF RIGHT KNEE, UNSPECIFIED OSTEOARTHRITIS TYPE: ICD-10-CM

## 2025-05-27 DIAGNOSIS — M22.2X1 PATELLOFEMORAL PAIN SYNDROME OF RIGHT KNEE: ICD-10-CM

## 2025-05-27 PROCEDURE — 2500000004 HC RX 250 GENERAL PHARMACY W/ HCPCS (ALT 636 FOR OP/ED): Mod: JZ | Performed by: STUDENT IN AN ORGANIZED HEALTH CARE EDUCATION/TRAINING PROGRAM

## 2025-05-27 PROCEDURE — 99214 OFFICE O/P EST MOD 30 MIN: CPT | Mod: 25 | Performed by: STUDENT IN AN ORGANIZED HEALTH CARE EDUCATION/TRAINING PROGRAM

## 2025-05-27 PROCEDURE — 20611 DRAIN/INJ JOINT/BURSA W/US: CPT | Mod: RT | Performed by: STUDENT IN AN ORGANIZED HEALTH CARE EDUCATION/TRAINING PROGRAM

## 2025-05-27 PROCEDURE — 99214 OFFICE O/P EST MOD 30 MIN: CPT | Performed by: STUDENT IN AN ORGANIZED HEALTH CARE EDUCATION/TRAINING PROGRAM

## 2025-05-27 RX ADMIN — ROPIVACAINE HYDROCHLORIDE 2 ML: 5 INJECTION EPIDURAL; INFILTRATION; PERINEURAL at 17:46

## 2025-05-27 RX ADMIN — METHYLPREDNISOLONE ACETATE 80 MG: 40 INJECTION, SUSPENSION INTRA-ARTICULAR; INTRALESIONAL; INTRAMUSCULAR; INTRASYNOVIAL; SOFT TISSUE at 17:46

## 2025-05-27 RX ADMIN — LIDOCAINE HYDROCHLORIDE 2 ML: 10 INJECTION, SOLUTION INFILTRATION; PERINEURAL at 17:46

## 2025-05-27 NOTE — PROGRESS NOTES
Sports Medicine Office Note    Today's Date:  05/27/2025     HPI: Jamilah Rodriguez is a 49 y.o. *** who presents today for ***    ***    *** has no other complaints.    Physical Examination:     ***    Imaging:  Radiographs of the *** were reviewed and revealed ***.    The studies were reviewed by me personally in the office today.    === 11/06/24 ===    XR KNEE RIGHT 4+ VIEWS    - Impression -  1. Moderate patellofemoral and mild medial/lateral compartment  osteoarthrosis of the right knee.    MACRO:  None    Signed by: Taylor Zhu 11/9/2024 6:15 AM  Dictation workstation:   [id number]    Problem List Items Addressed This Visit           ICD-10-CM    Osteoarthritis of right knee M17.11    Relevant Orders    Point of Care Ultrasound (Completed)     Procedure Note:  After consent was obtained, the right knee was prepped in a sterile fashion. Ultrasound guidance was used to help insure proper needle placement into the  joint , decrease patient discomfort, and decrease collateral damage. The joint was visualized and Depo-Medrol 80 mg with lidocaine 1% 2 mL & ropivacaine 0.5% 2 mL were injected without any complications. Ultrasound images were saved on an internal file for later reference. The patient tolerated the procedure well and the area was cleaned and bandaged.  Patient ID: Jamilah Rodriguez is a 49 y.o. female.    L Inj/Asp: R knee on 5/27/2025 5:28 PM  Indications: pain  Details: 22 G needle, ultrasound-guided superolateral approach  Medications: 2 mL lidocaine 10 mg/mL (1 %); 80 mg methylPREDNISolone acetate 40 mg/mL; 2 mL ropivacaine 5 mg/mL (0.5 %)  Outcome: tolerated well, no immediate complications  Procedure, treatment alternatives, risks and benefits explained, specific risks discussed. Consent was given by the patient. Immediately prior to procedure a time out was called to verify the correct patient, procedure, equipment, support staff and site/side marked as required. Patient was prepped and draped  "in the usual sterile fashion.         Assessment and Plan:    We reviewed the exam and imaging findings and discussed the conservative and surgical treatment options. We agreed to a diagnostic and hopefully therapeutic cortisone injection into the ***.  {Blank single:80486::\" He\",\" She\"} tolerated this well. Activity modifications were reviewed. {Blank single:57494::\" He\",\" She\"} will keep any scheduled follow-up with ***. Physical therapy referral *** provided and discussed the importance of regular home exercises.  Recommended prescription doses of Tylenol and encouraged use of ice or heat as needed for acute flares of pain.  Plan for follow-up in *** for re-evaluation, otherwise may follow-up sooner if any new concerns arise.  Discussed this plan with the patient who is understanding and agreeable.    **This note was dictated using Dragon speech recognition software and was not corrected for spelling or grammatical errors**.    Nguyễn Sue,   Primary Care Sports Medicine  [facility] [place]   " associated pain that improved with return to neutral position. Lachman's, Panchito's, and posterior drawer are all negative. The opposite knee is nontender and stable. Gait is slightly painful, antalgic, and tandem.      Imaging:  Radiographs of the right knee obtained 11/6/2024 were reviewed and revealed tricompartmental DJD, moderate in patellofemoral compartment and mild in medial/lateral compartment, otherwise no acute osseous abnormalities.      The studies were reviewed by me personally in the office today.    Problem List Items Addressed This Visit           ICD-10-CM    Patellofemoral pain syndrome of right knee M22.2X1    Osteoarthritis of right knee - Primary M17.11    Relevant Orders    Point of Care Ultrasound (Completed)     Procedure Note:  After consent was obtained, the right knee was prepped in a sterile fashion. Ultrasound guidance was used to help insure proper needle placement into the  joint , decrease patient discomfort, and decrease collateral damage. The joint was visualized and Depo-Medrol 80 mg with lidocaine 1% 2 mL & ropivacaine 0.5% 2 mL were injected without any complications. Ultrasound images were saved on an internal file for later reference. The patient tolerated the procedure well and the area was cleaned and bandaged.  Patient ID: Jamilah Rodriguez is a 49 y.o. female.    L Inj/Asp: R knee on 5/27/2025 5:46 PM  Indications: pain  Details: 22 G needle, ultrasound-guided superolateral approach  Medications: 2 mL lidocaine 10 mg/mL (1 %); 80 mg methylPREDNISolone acetate 40 mg/mL; 2 mL ropivacaine 5 mg/mL (0.5 %)  Outcome: tolerated well, no immediate complications  Procedure, treatment alternatives, risks and benefits explained, specific risks discussed. Consent was given by the patient. Immediately prior to procedure a time out was called to verify the correct patient, procedure, equipment, support staff and site/side marked as required. Patient was prepped and draped in the usual  sterile fashion.         Assessment and Plan:    We reviewed the exam and imaging findings and discussed the conservative and surgical treatment options. We agreed to a repeat hopefully therapeutic cortisone injection into the right knee joint.   She tolerated this well. Activity modifications were reviewed.  Encouraged her to continue with regular home exercise program as provided through PT, as tolerated..  Recommended prescription doses of Tylenol, Voltaren gel, and encouraged use of ice/heat, compression, elevation, and rest as needed for acute flares of pain.  She may otherwise take OTC anti-inflammatory such as naproxen up to 440 mg twice daily with food as needed for acute flares of pain.  Discussed with patient that we may consider repeating cortisone injection in 3 months or more if she continues to get adequate pain relief and pain returns.  Alternatively, may consider trial of viscosupplementation injections if she does not get significant pain relief following today's cortisone injection.  May continue with patellofemoral offloader knee brace with daily activity/exercise for added comfort/stability.  Plan for follow-up in 3 months for re-evaluation, otherwise may follow-up sooner if any new concerns arise.  Discussed this plan with the patient who is understanding and agreeable.    **This note was dictated using Dragon speech recognition software and was not corrected for spelling or grammatical errors**.    Nguyễn Sue DO  Primary Care Sports Medicine  [facility] [place]

## 2025-06-09 RX ORDER — ROPIVACAINE HYDROCHLORIDE 5 MG/ML
2 INJECTION, SOLUTION EPIDURAL; INFILTRATION; PERINEURAL
Status: COMPLETED | OUTPATIENT
Start: 2025-05-27 | End: 2025-05-27

## 2025-06-09 RX ORDER — LIDOCAINE HYDROCHLORIDE 10 MG/ML
2 INJECTION, SOLUTION INFILTRATION; PERINEURAL
Status: COMPLETED | OUTPATIENT
Start: 2025-05-27 | End: 2025-05-27

## 2025-06-09 RX ORDER — METHYLPREDNISOLONE ACETATE 40 MG/ML
80 INJECTION, SUSPENSION INTRA-ARTICULAR; INTRALESIONAL; INTRAMUSCULAR; SOFT TISSUE
Status: COMPLETED | OUTPATIENT
Start: 2025-05-27 | End: 2025-05-27

## 2025-08-26 ENCOUNTER — APPOINTMENT (OUTPATIENT)
Dept: ORTHOPEDIC SURGERY | Facility: HOSPITAL | Age: 50
End: 2025-08-26
Payer: COMMERCIAL